# Patient Record
Sex: FEMALE | Race: WHITE | NOT HISPANIC OR LATINO | URBAN - METROPOLITAN AREA
[De-identification: names, ages, dates, MRNs, and addresses within clinical notes are randomized per-mention and may not be internally consistent; named-entity substitution may affect disease eponyms.]

---

## 2019-10-29 ENCOUNTER — OUTPATIENT (OUTPATIENT)
Dept: OUTPATIENT SERVICES | Facility: HOSPITAL | Age: 34
LOS: 1 days | Discharge: HOME | End: 2019-10-29

## 2019-10-30 DIAGNOSIS — R10.9 UNSPECIFIED ABDOMINAL PAIN: ICD-10-CM

## 2020-04-26 ENCOUNTER — MESSAGE (OUTPATIENT)
Age: 35
End: 2020-04-26

## 2021-06-07 ENCOUNTER — EMERGENCY (EMERGENCY)
Facility: HOSPITAL | Age: 36
LOS: 0 days | Discharge: HOME | End: 2021-06-08
Attending: EMERGENCY MEDICINE | Admitting: EMERGENCY MEDICINE
Payer: COMMERCIAL

## 2021-06-07 VITALS
HEIGHT: 65 IN | TEMPERATURE: 98 F | HEART RATE: 86 BPM | SYSTOLIC BLOOD PRESSURE: 119 MMHG | OXYGEN SATURATION: 99 % | DIASTOLIC BLOOD PRESSURE: 58 MMHG | RESPIRATION RATE: 18 BRPM | WEIGHT: 149.91 LBS

## 2021-06-07 DIAGNOSIS — H53.8 OTHER VISUAL DISTURBANCES: ICD-10-CM

## 2021-06-07 DIAGNOSIS — Z98.891 HISTORY OF UTERINE SCAR FROM PREVIOUS SURGERY: Chronic | ICD-10-CM

## 2021-06-07 DIAGNOSIS — H53.131 SUDDEN VISUAL LOSS, RIGHT EYE: ICD-10-CM

## 2021-06-07 LAB
ALBUMIN SERPL ELPH-MCNC: 4.7 G/DL — SIGNIFICANT CHANGE UP (ref 3.5–5.2)
ALP SERPL-CCNC: 72 U/L — SIGNIFICANT CHANGE UP (ref 30–115)
ALT FLD-CCNC: 13 U/L — SIGNIFICANT CHANGE UP (ref 0–41)
ANION GAP SERPL CALC-SCNC: 9 MMOL/L — SIGNIFICANT CHANGE UP (ref 7–14)
APTT BLD: 31.5 SEC — SIGNIFICANT CHANGE UP (ref 27–39.2)
AST SERPL-CCNC: 21 U/L — SIGNIFICANT CHANGE UP (ref 0–41)
BASE EXCESS BLDV CALC-SCNC: 1.1 MMOL/L — SIGNIFICANT CHANGE UP (ref -2–2)
BASOPHILS # BLD AUTO: 0.03 K/UL — SIGNIFICANT CHANGE UP (ref 0–0.2)
BASOPHILS NFR BLD AUTO: 0.4 % — SIGNIFICANT CHANGE UP (ref 0–1)
BILIRUB SERPL-MCNC: 0.4 MG/DL — SIGNIFICANT CHANGE UP (ref 0.2–1.2)
BUN SERPL-MCNC: 17 MG/DL — SIGNIFICANT CHANGE UP (ref 10–20)
CALCIUM SERPL-MCNC: 9.9 MG/DL — SIGNIFICANT CHANGE UP (ref 8.5–10.1)
CHLORIDE SERPL-SCNC: 106 MMOL/L — SIGNIFICANT CHANGE UP (ref 98–110)
CO2 SERPL-SCNC: 26 MMOL/L — SIGNIFICANT CHANGE UP (ref 17–32)
CREAT SERPL-MCNC: 1 MG/DL — SIGNIFICANT CHANGE UP (ref 0.7–1.5)
EOSINOPHIL # BLD AUTO: 0.1 K/UL — SIGNIFICANT CHANGE UP (ref 0–0.7)
EOSINOPHIL NFR BLD AUTO: 1.3 % — SIGNIFICANT CHANGE UP (ref 0–8)
GLUCOSE SERPL-MCNC: 89 MG/DL — SIGNIFICANT CHANGE UP (ref 70–99)
HCG SERPL QL: NEGATIVE — SIGNIFICANT CHANGE UP
HCO3 BLDV-SCNC: 26 MMOL/L — SIGNIFICANT CHANGE UP (ref 22–29)
HCT VFR BLD CALC: 38.4 % — SIGNIFICANT CHANGE UP (ref 37–47)
HGB BLD-MCNC: 12.8 G/DL — SIGNIFICANT CHANGE UP (ref 12–16)
IMM GRANULOCYTES NFR BLD AUTO: 0.4 % — HIGH (ref 0.1–0.3)
INR BLD: 1.09 RATIO — SIGNIFICANT CHANGE UP (ref 0.65–1.3)
LACTATE BLDV-MCNC: 0.6 MMOL/L — SIGNIFICANT CHANGE UP (ref 0.5–1.6)
LYMPHOCYTES # BLD AUTO: 2.26 K/UL — SIGNIFICANT CHANGE UP (ref 1.2–3.4)
LYMPHOCYTES # BLD AUTO: 29 % — SIGNIFICANT CHANGE UP (ref 20.5–51.1)
MCHC RBC-ENTMCNC: 29.5 PG — SIGNIFICANT CHANGE UP (ref 27–31)
MCHC RBC-ENTMCNC: 33.3 G/DL — SIGNIFICANT CHANGE UP (ref 32–37)
MCV RBC AUTO: 88.5 FL — SIGNIFICANT CHANGE UP (ref 81–99)
MONOCYTES # BLD AUTO: 0.67 K/UL — HIGH (ref 0.1–0.6)
MONOCYTES NFR BLD AUTO: 8.6 % — SIGNIFICANT CHANGE UP (ref 1.7–9.3)
NEUTROPHILS # BLD AUTO: 4.7 K/UL — SIGNIFICANT CHANGE UP (ref 1.4–6.5)
NEUTROPHILS NFR BLD AUTO: 60.3 % — SIGNIFICANT CHANGE UP (ref 42.2–75.2)
NRBC # BLD: 0 /100 WBCS — SIGNIFICANT CHANGE UP (ref 0–0)
PCO2 BLDV: 41 MMHG — SIGNIFICANT CHANGE UP (ref 41–51)
PH BLDV: 7.41 — SIGNIFICANT CHANGE UP (ref 7.26–7.43)
PLATELET # BLD AUTO: 213 K/UL — SIGNIFICANT CHANGE UP (ref 130–400)
PO2 BLDV: 46 MMHG — HIGH (ref 20–40)
POTASSIUM SERPL-MCNC: 3.9 MMOL/L — SIGNIFICANT CHANGE UP (ref 3.5–5)
POTASSIUM SERPL-SCNC: 3.9 MMOL/L — SIGNIFICANT CHANGE UP (ref 3.5–5)
PROT SERPL-MCNC: 7.3 G/DL — SIGNIFICANT CHANGE UP (ref 6–8)
PROTHROM AB SERPL-ACNC: 12.5 SEC — SIGNIFICANT CHANGE UP (ref 9.95–12.87)
RBC # BLD: 4.34 M/UL — SIGNIFICANT CHANGE UP (ref 4.2–5.4)
RBC # FLD: 13.2 % — SIGNIFICANT CHANGE UP (ref 11.5–14.5)
SAO2 % BLDV: 81 % — SIGNIFICANT CHANGE UP
SODIUM SERPL-SCNC: 141 MMOL/L — SIGNIFICANT CHANGE UP (ref 135–146)
TROPONIN T SERPL-MCNC: <0.01 NG/ML — SIGNIFICANT CHANGE UP
WBC # BLD: 7.79 K/UL — SIGNIFICANT CHANGE UP (ref 4.8–10.8)
WBC # FLD AUTO: 7.79 K/UL — SIGNIFICANT CHANGE UP (ref 4.8–10.8)

## 2021-06-07 PROCEDURE — 99220: CPT

## 2021-06-07 PROCEDURE — 70498 CT ANGIOGRAPHY NECK: CPT | Mod: 26,MA

## 2021-06-07 PROCEDURE — 70450 CT HEAD/BRAIN W/O DYE: CPT | Mod: 26,59,MA

## 2021-06-07 PROCEDURE — 70496 CT ANGIOGRAPHY HEAD: CPT | Mod: 26,MA

## 2021-06-07 PROCEDURE — 93010 ELECTROCARDIOGRAM REPORT: CPT

## 2021-06-07 PROCEDURE — 99282 EMERGENCY DEPT VISIT SF MDM: CPT

## 2021-06-07 PROCEDURE — 0042T: CPT

## 2021-06-07 RX ORDER — ASPIRIN/CALCIUM CARB/MAGNESIUM 324 MG
325 TABLET ORAL ONCE
Refills: 0 | Status: COMPLETED | OUTPATIENT
Start: 2021-06-07 | End: 2021-06-07

## 2021-06-07 RX ADMIN — Medication 325 MILLIGRAM(S): at 21:21

## 2021-06-07 NOTE — CONSULT NOTE ADULT - SUBJECTIVE AND OBJECTIVE BOX
**STROKE CODE CONSULT NOTE**    Last known well time/Time of onset of symptoms:    HPI: 36 year old female with no PMHx, no FMHx of stroke, coming in with CC of right visual loss. Code stroke upon presentation. Taken to CT scan and further workup.       T(C): 36.5 (06-07-21 @ 20:30), Max: 36.5 (06-07-21 @ 20:15)  HR: 83 (06-07-21 @ 20:30) (83 - 86)  BP: 114/64 (06-07-21 @ 20:30) (114/64 - 119/58)  RR: 20 (06-07-21 @ 20:30) (18 - 20)  SpO2: 99% (06-07-21 @ 20:30) (99% - 99%)    PAST MEDICAL & SURGICAL HISTORY:      FAMILY HISTORY:      SOCIAL HISTORY:    ROS:   Constitutional: No fever, weight loss or fatigue  Eyes: No eye pain, visual disturbances, or discharge  ENMT:  No difficulty hearing, tinnitus, vertigo; No sinus or throat pain  Neck: No pain or stiffness  Respiratory: No cough, wheezing, chills or hemoptysis  Cardiovascular: No chest pain, palpitations, shortness of breath, dizziness or leg swelling  Gastrointestinal: No abdominal pain. No nausea, vomiting or hematemesis; No diarrhea or constipation. Nohematochezia.  Genitourinary: No dysuria, frequency, hematuria or incontinence  Neurological: As per HPI      MEDICATIONS  (STANDING):    MEDICATIONS  (PRN):    Allergies    No Known Allergies    Intolerances      Vital Signs Last 24 Hrs  T(C): 36.5 (07 Jun 2021 20:30), Max: 36.5 (07 Jun 2021 20:15)  T(F): 97.7 (07 Jun 2021 20:30), Max: 97.7 (07 Jun 2021 20:15)  HR: 83 (07 Jun 2021 20:30) (83 - 86)  BP: 114/64 (07 Jun 2021 20:30) (114/64 - 119/58)  BP(mean): --  RR: 20 (07 Jun 2021 20:30) (18 - 20)  SpO2: 99% (07 Jun 2021 20:30) (99% - 99%)    Physical exam:  Constitutional: No acute distress, conversant  Eyes: Anicteric sclerae, moist conjunctivae, see below for CNs  Neck: trachea midline, FROM, supple, no thyromegaly or lymphadenopathy  Cardiovascular: Regular rate and rhythm, no murmurs, rubs, or gallops. No carotid bruits.   Pulmonary: Anterior breath sounds clear bilaterally, no crackles or wheezing. No use of accessory muscles  GI: Abdomen soft, non-distended, non-tender  Extremities: Radial and DP pulses +2, no edema    Neurologic:  -Mental status: Awake, alert, oriented to person, place, and time. Speech is fluent with intact naming, repetition, and comprehension, no dysarthria. Recent and remote memory intact. Follows commands. Attention/concentration intact. Fund of knowledge appropriate.  -Cranial nerves:   II: Visual fields are full to confrontation.  III, IV, VI: Extraocular movements are intact without nystagmus. Pupils equally round and reactive to light  V:  Facial sensation V1-V3 equal and intact   VII: Face is symmetric with normal eye closure and smile  VIII: Hearing is bilaterally intact to finger rub  IX, X: Uvula is midline and soft palate rises symmetrically  XI: Head turning and shoulder shrug are intact.  XII: Tongue protrudes midline  Motor: Normal bulk and tone. No pronator drift. Strength bilateral upper extremity 5/5, bilateral lower extremities 5/5.  Sensation: Intact to light touch bilaterally. No neglect or extinction on double simultaneous testing.  Coordination: No dysmetria on finger-to-nose and heel-to-shin bilaterally  Reflexes: Downgoing toes bilaterally   Gait: Narrow gait and steady    NIHSS:     ·  1a Level of Consciousness	 0   ·  1b Level of Consciousness	 0 	  ·  1c Level of Consciousness	 0 	  ·  2 Best Gaze	 	 0   ·  3 Visual	 	                  0   ·  4 Facial Palsy	 	 0   ·  5a Motor Left Arm	                  0   ·  5b Motor Right Arm	 0   ·  6a Motor Left Leg	                   0   ·  6b Motor Right Leg	 0    ·  7 Limb Ataxia	 	0   ·  8 Sensory	 	0   ·  9 Best language	 	0   ·  10 Dysarthria	 	0   ·  11 Extinction and Inattention 0  	    Total NIHSS Score =0      Fingerstick Blood Glucose: CAPILLARY BLOOD GLUCOSE  85 (07 Jun 2021 20:55)      POCT Blood Glucose.: 85 mg/dL (07 Jun 2021 20:24)    LABS:                        12.8   7.79  )-----------( 213      ( 07 Jun 2021 20:25 )             38.4     RADIOLOGY & ADDITIONAL STUDIES:      < from: CT Brain Stroke Protocol (06.07.21 @ 20:32) >    EXAM:  CT BRAIN STROKE PROTOCOL            PROCEDURE DATE:  06/07/2021      < end of copied text >  < from: CT Brain Stroke Protocol (06.07.21 @ 20:32) >  IMPRESSION:    No acute intracranial pathology.    < end of copied text >    CTA/CTP: Wet read by radiologist indicating no LVO.        -----------------------------------------------------------------------------------------------------------------  IV-tPA (Y/N):    N                           Bolus time:  Reason IV-tPA not given: NIH 0      Assessment and Plan:  36y Female w/ PMH coming in with visual loss. NIH 0 upon examination.  CT head with no acute events. CTA/CTP with no acute findings. No Centralized telestroke attending assessment needed at this time. Patient to be admitted to OBS for workup.       Admit to Obs for further workup:  MRI  TTE  EKG  Labs: A1c, lipid panel, TSH,       Please call with any patient concerns or questions.  Danie Perry NP  #[id number]

## 2021-06-07 NOTE — ED PROVIDER NOTE - PHYSICAL EXAMINATION
CONSTITUTIONAL: Well-developed; well-nourished; in no acute distress.   SKIN: warm, dry  HEAD: Normocephalic; atraumatic.  EYES: PERRL, EOMI, normal sclera and conjunctiva   ENT: No nasal discharge; airway clear.  NECK: Supple; non tender.  CARD:  Regular rate and rhythm.   RESP: NO inc WOB , lungs CTAB   ABD: soft ntnd  EXT: Normal ROM.    LYMPH: No acute cervical adenopathy.  NEURO: AAO x 3, normal speech, no facial asymmetry, negative pronator drift, no ataxia, negative Romberg, no dysdiadokinesia, no nystagmus, peripheral vision intact, sensory equal and intact.  PSYCH: Cooperative, appropriate.

## 2021-06-07 NOTE — ED CDU PROVIDER INITIAL DAY NOTE - PHYSICAL EXAMINATION
PHYSICAL EXAM:    GENERAL: Alert, appears stated age, well appearing, non-toxic  SKIN: Warm, pink and dry. MMM.   HEAD: NC, AT  EYE: Normal lids/conjunctiva, PERRL, EOMI  ENT: Normal hearing, patent oropharynx  NECK: +supple. No meningismus, or JVD  Pulm: Bilateral BS, normal resp effort, no wheezes, stridor, or retractions  CV: RRR, no M/R/G, 2+and = radial pulses  Abd: soft, non-tender, non-distended  Mskel: no erythema, cyanosis, edema. no calf tenderness  Neuro: AAOx3, no sensory/motor deficits, CN 2-12 intact. No speech slurring, pronator drift, facial asymmetry. normal finger-to-nose b/l. 5/5 strength throughout. normal gait. negative romberg.

## 2021-06-07 NOTE — ED PROVIDER NOTE - OBJECTIVE STATEMENT
Pt is a 36y Female w/ PMH coming in with visual loss in her R eye . She reports today she was bathing her daughter when she suddenly lost vision in her R eye.  No numbness or tingling in her extremities. No HA. No n/v . No OCP use, no hx of clotting disorder. Pt reports symptoms have mostly resolved at this time.

## 2021-06-07 NOTE — ED PROVIDER NOTE - CLINICAL SUMMARY MEDICAL DECISION MAKING FREE TEXT BOX
patient had stroke like symptoms, I considered TPA however patients NIH score is 0 at this time. we will admit patient to observation for MRI and further work up.

## 2021-06-07 NOTE — ED CDU PROVIDER INITIAL DAY NOTE - NS ED ROS FT
Review of Systems    Constitutional: (-) fever   Eyes/ENT: (+) vision changes  Cardiovascular: (-) chest pain, (-) syncope (-) palpitations  Respiratory: (-) cough, (-) shortness of breath  Gastrointestinal: (-) vomiting, (-) diarrhea (-) abdominal pain  Genitourinary:  (-) dysuria   Musculoskeletal: (-) neck pain, (-) back pain, (-) leg pain/swelling  Integumentary: (-) rash, (-) edema  Neurological: (-) headache  Hematologic: (-) easy bruising

## 2021-06-07 NOTE — ED CDU PROVIDER INITIAL DAY NOTE - OBJECTIVE STATEMENT
35 y/o F without PMH, was bathing her daughter and suddenly lost vision in R eye @ 7pm, lasting minutes, then resolved. no palliating/provoking factors.   no weakness, paresthesia, n/v/d/ab pain/cp/sob.   in ED NIH 0, neuro recommended obs for tia workup.

## 2021-06-07 NOTE — ED PROVIDER NOTE - ATTENDING CONTRIBUTION TO CARE
patient with new onset vision loss that is central in nature that occurred at 7pm this then resolved over span of minutes patient with new onset vision loss that is central in nature that occurred at 7pm this then resolved over span of minutes. no vision is back to [facility]. she denies any other deficit at the time and currently, exam shows NIH score of 0 with nml vision. plan is to obtain imaging and labs and consult neurology.

## 2021-06-08 ENCOUNTER — TRANSCRIPTION ENCOUNTER (OUTPATIENT)
Age: 36
End: 2021-06-08

## 2021-06-08 VITALS
HEART RATE: 70 BPM | DIASTOLIC BLOOD PRESSURE: 62 MMHG | SYSTOLIC BLOOD PRESSURE: 119 MMHG | RESPIRATION RATE: 18 BRPM | OXYGEN SATURATION: 99 %

## 2021-06-08 LAB
A1C WITH ESTIMATED AVERAGE GLUCOSE RESULT: 4.9 % — SIGNIFICANT CHANGE UP (ref 4–5.6)
CHOLEST SERPL-MCNC: 165 MG/DL — SIGNIFICANT CHANGE UP
ESTIMATED AVERAGE GLUCOSE: 94 MG/DL — SIGNIFICANT CHANGE UP (ref 68–114)
HDLC SERPL-MCNC: 87 MG/DL — SIGNIFICANT CHANGE UP
HIV 1 & 2 AB SERPL IA.RAPID: SIGNIFICANT CHANGE UP
LIPID PNL WITH DIRECT LDL SERPL: 73 MG/DL — SIGNIFICANT CHANGE UP
NON HDL CHOLESTEROL: 78 MG/DL — SIGNIFICANT CHANGE UP
SARS-COV-2 RNA SPEC QL NAA+PROBE: SIGNIFICANT CHANGE UP
TRIGL SERPL-MCNC: 54 MG/DL — SIGNIFICANT CHANGE UP
TSH SERPL-MCNC: 2.44 UIU/ML — SIGNIFICANT CHANGE UP (ref 0.27–4.2)

## 2021-06-08 PROCEDURE — 70553 MRI BRAIN STEM W/O & W/DYE: CPT | Mod: 26,MA

## 2021-06-08 PROCEDURE — 93306 TTE W/DOPPLER COMPLETE: CPT | Mod: 26

## 2021-06-08 PROCEDURE — 70543 MRI ORBT/FAC/NCK W/O &W/DYE: CPT | Mod: 26,MA

## 2021-06-08 PROCEDURE — 99217: CPT

## 2021-06-08 RX ORDER — MUPIROCIN 20 MG/G
1 OINTMENT TOPICAL
Qty: 1 | Refills: 0
Start: 2021-06-08 | End: 2021-06-12

## 2021-06-08 RX ORDER — MUPIROCIN 20 MG/G
1 OINTMENT TOPICAL ONCE
Refills: 0 | Status: DISCONTINUED | OUTPATIENT
Start: 2021-06-08 | End: 2021-06-07

## 2021-06-08 NOTE — ED CDU PROVIDER SUBSEQUENT DAY NOTE - PROGRESS NOTE DETAILS
Patient had MR this evening already. patient doesn't want to wait for MR result. asymptomatic for 24 hours already. prefer to go home.  I had extensive discussion of risks and benefits of pursuing further medical evaluation and/or care with patient and any available family/friends; patient still electing to leave against medical advice. Patient is awake, alert, oriented and demonstrates full capacity and insight into illness. Patient aware and encouraged to return immediately to ED or nearest ED if patient decides to change mind regarding care or if patient experiences any new, worsening, or concerning symptoms.
PT with transient visual disturbance, no complaints at this time. Pending MRI

## 2021-06-08 NOTE — PROGRESS NOTE ADULT - ASSESSMENT
36y Female w no significant past medical history presented with transient visual field deficit in the right eye. Stroke work up was negative initially in the ED.   Currently exam shows no deficit and fundoscopy exam shows normal optic nerves.  Unclear etiology. Recommend to obtain MRI of Brain and orbit w/wo contrast to r/o CNS infarct and demyelinating disease.     - Brain MRI w/wo contrast  - MRI of Orbit w/wo contrast  - Could follow up as an outpatient if the studies were normal

## 2021-06-08 NOTE — ED CDU PROVIDER DISPOSITION NOTE - NSFOLLOWUPINSTRUCTIONS_ED_ALL_ED_FT
Blurred Vision, Adult    Having blurred vision means that you cannot see things clearly. Your vision may seem fuzzy or out of focus. It can involve your vision for objects that are close or far away. It may affect one or both eyes. There are many causes of blurred vision, including cataracts, macular degeneration, eye inflammation (uveitis), and diabetic retinopathy.    ImageIn many cases, blurred vision has to do with the shape of your eye. An abnormal eye shape means you cannot focus well (refractive error). When this happens, it can cause:    Faraway objects to look blurry (nearsightedness).  Close objects to look blurry (farsightedness).  Blurry vision at any distance (astigmatism).    Refractive errors are often corrected with glasses or contacts.    Image ImageBlurred vision can be diagnosed based on your symptoms and a physical exam. Tell your health care provider about any other health problems you have, any recent eye injury, and any prior surgeries. You may need to see a health care provider who specializes in eye problems (ophthalmologist). Your treatment will depend on what is causing your blurred vision.    Follow these instructions at home:  Keep all follow-up visits as told by your health care provider. This is important. These include any visits to your eye specialists.  Do not drive or use heavy machinery if your vision is blurry.  Use eye drops only as told by your health care provider.  If you were prescribed glasses or contact lenses, wear the glasses or contacts as told by your health care provider.  Schedule eye exams regularly.  Pay attention to any changes in your symptoms.  Contact a health care provider if:  Your symptoms do not improve or they get worse.  You have:    New symptoms.  A headache.  Trouble seeing at night.  Trouble noticing the difference between colors.    You notice:    Drooping of your eyelids.  Drainage coming from your eyes.  A rash around your eyes.    Get help right away if:  You have:    Severe eye pain.  A severe headache.  A sudden change in vision.  A sudden loss of vision.  A vision change after an injury.    You notice flashing lights in your field of vision. Your field of vision is the area that you can see without moving your eyes.  Summary  Having blurred vision means that you cannot see things clearly. Your vision may seem fuzzy or out of focus.  There are many causes of blurred vision. In many cases, blurred vision has to do with an abnormal eye shape (refractive error), and it can be corrected with glasses or contact lenses.  Pay attention to any changes in your symptoms. Contact a health care provider if your symptoms do not improve or if you have any new symptoms.  This information is not intended to replace advice given to you by your health care provider. Make sure you discuss any questions you have with your health care provider

## 2021-06-08 NOTE — ED CDU PROVIDER DISPOSITION NOTE - CARE PROVIDER_API CALL
Arsenio Hoffman)  EEGEpilepsy; Neurology  [address] [place] [address], [address]  [place] [address]  Phone: [phone]  Fax: [phone]  Follow Up Time:

## 2021-06-08 NOTE — PROGRESS NOTE ADULT - SUBJECTIVE AND OBJECTIVE BOX
Neurology Follow up note    Name  CARLA FARAH    HPI:  36 year old female with no PMHx, no FMHx of stroke, coming in with CC of right visual loss. She reports sudden onset visual field deficit in the center of the right eye which then started to fade away towards the peripheral vision and resolved spontaneously after few hours. Has mild frontal headache now but denies associated headache. Denies any associated numbness, weakness or speech difficulties.     Interval History -  No overnight issues. CT head, CTA head and neck and CTP were unremarkable       Vital Signs Last 24 Hrs  T(C): 36.4 (08 Jun 2021 07:30), Max: 36.8 (08 Jun 2021 04:38)  T(F): 97.5 (08 Jun 2021 07:30), Max: 98.3 (08 Jun 2021 04:38)  HR: 74 (08 Jun 2021 07:30) (74 - 86)  BP: 118/53 (08 Jun 2021 07:30) (92/51 - 119/58)  BP(mean): --  RR: 16 (08 Jun 2021 07:30) (16 - 20)  SpO2: 99% (08 Jun 2021 07:30) (97% - 99%)  ICU Vital Signs Last 24 Hrs  T(C): 36.4 (08 Jun 2021 07:30), Max: 36.8 (08 Jun 2021 04:38)  T(F): 97.5 (08 Jun 2021 07:30), Max: 98.3 (08 Jun 2021 04:38)  HR: 74 (08 Jun 2021 07:30) (74 - 86)  BP: 118/53 (08 Jun 2021 07:30) (92/51 - 119/58)  BP(mean): --  ABP: --  ABP(mean): --  RR: 16 (08 Jun 2021 07:30) (16 - 20)  SpO2: 99% (08 Jun 2021 07:30) (97% - 99%)      Neurological Exam:   Mental status: Awake, alert and oriented x3.  Recent and remote memory intact.  Naming, repetition and comprehension intact.  Attention/concentration intact.  No dysarthria, no aphasia.  Fund of knowledge appropriate.    Cranial nerves: Fundoscopic exam demonstrated no abnormalities, pupils equally round and reactive to light, visual fields full, no nystagmus, extraocular muscles intact, V1 through V3 intact bilaterally and symmetric, face symmetric, hearing intact to finger rub, palate elevation symmetric, tongue was midline, sternocleidomastoid/shoulder shrug strength bilaterally 5/5.  Fundoscopy exam showed no evidence of disc edema OU.   Motor:  Normal bulk and tone, strength 5/5 in bilateral upper and lower extremities.   strength 5/5.  Rapid alternating movements intact and symmetric.   Sensation: Intact to light touch, proprioception, and pinprick.  No neglect.   Coordination: No dysmetria on finger-to-nose and heel-to-shin.  No clumsiness.  Reflexes: 2+ in upper and lower extremities, downgoing toes bilaterally  Gait: Narrow and steady. No ataxia.  Romberg negative    Medications      Lab                        12.8   7.79  )-----------( 213      ( 07 Jun 2021 20:25 )             38.4     06-07    141  |  106  |  17  ----------------------------<  89  3.9   |  26  |  1.0    Ca    9.9      07 Jun 2021 20:25    TPro  7.3  /  Alb  4.7  /  TBili  0.4  /  DBili  x   /  AST  21  /  ALT  13  /  AlkPhos  72  06-07    CAPILLARY BLOOD GLUCOSE  85 (07 Jun 2021 20:55)      POCT Blood Glucose.: 85 mg/dL (07 Jun 2021 20:24)    LIVER FUNCTIONS - ( 07 Jun 2021 20:25 )  Alb: 4.7 g/dL / Pro: 7.3 g/dL / ALK PHOS: 72 U/L / ALT: 13 U/L / AST: 21 U/L / GGT: x           PT/INR - ( 07 Jun 2021 20:25 )   PT: 12.50 sec;   INR: 1.09 ratio         PTT - ( 07 Jun 2021 20:25 )  PTT:31.5 sec    CTA head and neck:   CTA HEAD:  No evidence of flow-limiting stenosis, occlusion or aneurysm.    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    CTA NECK:  No evidence of carotid artery or vertebral artery stenosis.

## 2021-06-08 NOTE — ED CDU PROVIDER DISPOSITION NOTE - PATIENT PORTAL LINK FT
You can access the FollowMyHealth Patient Portal offered by [facility] by registering at the following website: [email or web address]. By joining [employer]’s FollowMyHealth portal, you will also be able to view your health information using other applications (apps) compatible with our system.

## 2021-06-08 NOTE — ED CDU PROVIDER SUBSEQUENT DAY NOTE - ATTENDING CONTRIBUTION TO CARE
36y Female w/ PMH coming in with visual loss. NIH 0 upon examination.  CT head with no acute events. CTA/CTP with no acute findings. Pt does not want to wait for MRI results. Eager to leave. Asympomatic now w no visual changes. NIH 0. The patient wishes to leave against medical advice.  I have discussed the risks, benefits and alternatives (including the possibility of worsening of disease, pain, permanent disability, and/or death) with the patient and his/her family (if available).  The patient voices understanding of these risks, benefits, and alternatives and still wishes to sign out against medical advice.  The patient is awake, alert, oriented  x 3 and has demonstrated capacity to refuse/direct care.  I have advised the patient that they can and should return immediately should they develop any worse/different/additional symptoms, or if they change their mind and want to continue their care.

## 2021-06-08 NOTE — ED CDU PROVIDER DISPOSITION NOTE - ATTENDING CONTRIBUTION TO CARE
I personally evaluated the patient. I reviewed the Resident’s or Physician Assistant’s note (as assigned above), and agree with the findings and plan except as documented in my note.    36y Female w/ PMH coming in with visual loss. NIH 0 upon examination.  CT head with no acute events. CTA/CTP with no acute findings. Pt does not want to wait for MRI results. Eager to leave. Asympomatic now w no visual changes. NIH 0. The patient wishes to leave against medical advice.  I have discussed the risks, benefits and alternatives (including the possibility of worsening of disease, pain, permanent disability, and/or death) with the patient and his/her family (if available).  The patient voices understanding of these risks, benefits, and alternatives and still wishes to sign out against medical advice.  The patient is awake, alert, oriented  x 3 and has demonstrated capacity to refuse/direct care.  I have advised the patient that they can and should return immediately should they develop any worse/different/additional symptoms, or if they change their mind and want to continue their care.

## 2021-06-09 PROBLEM — Z00.00 ENCOUNTER FOR PREVENTIVE HEALTH EXAMINATION: Status: ACTIVE | Noted: 2021-06-09

## 2021-06-09 NOTE — ED POST DISCHARGE NOTE - RESULT SUMMARY
MR findings disclosed to patient. f/u with neurology as outpatient. patient also reported rash after MR with contrast. recommend benadryl and prednisone sent to patient's pharmacy. encourage return for worsening symptoms.

## 2022-07-21 ENCOUNTER — TRANSCRIPTION ENCOUNTER (OUTPATIENT)
Age: 37
End: 2022-07-21

## 2022-07-21 ENCOUNTER — INPATIENT (INPATIENT)
Facility: HOSPITAL | Age: 37
LOS: 1 days | Discharge: HOME | End: 2022-07-23
Attending: OBSTETRICS & GYNECOLOGY | Admitting: OBSTETRICS & GYNECOLOGY

## 2022-07-21 VITALS — HEART RATE: 80 BPM | DIASTOLIC BLOOD PRESSURE: 57 MMHG | SYSTOLIC BLOOD PRESSURE: 108 MMHG

## 2022-07-21 DIAGNOSIS — Z98.891 HISTORY OF UTERINE SCAR FROM PREVIOUS SURGERY: Chronic | ICD-10-CM

## 2022-07-21 LAB
AMPHET UR-MCNC: NEGATIVE — SIGNIFICANT CHANGE UP
APPEARANCE UR: ABNORMAL
BACTERIA # UR AUTO: NEGATIVE — SIGNIFICANT CHANGE UP
BARBITURATES UR SCN-MCNC: NEGATIVE — SIGNIFICANT CHANGE UP
BASOPHILS # BLD AUTO: 0.05 K/UL — SIGNIFICANT CHANGE UP (ref 0–0.2)
BASOPHILS NFR BLD AUTO: 0.4 % — SIGNIFICANT CHANGE UP (ref 0–1)
BENZODIAZ UR-MCNC: NEGATIVE — SIGNIFICANT CHANGE UP
BILIRUB UR-MCNC: NEGATIVE — SIGNIFICANT CHANGE UP
BLD GP AB SCN SERPL QL: SIGNIFICANT CHANGE UP
BUPRENORPHINE SCREEN, URINE RESULT: NEGATIVE — SIGNIFICANT CHANGE UP
COCAINE METAB.OTHER UR-MCNC: NEGATIVE — SIGNIFICANT CHANGE UP
COLOR SPEC: YELLOW — SIGNIFICANT CHANGE UP
DIFF PNL FLD: NEGATIVE — SIGNIFICANT CHANGE UP
EOSINOPHIL # BLD AUTO: 0.03 K/UL — SIGNIFICANT CHANGE UP (ref 0–0.7)
EOSINOPHIL NFR BLD AUTO: 0.2 % — SIGNIFICANT CHANGE UP (ref 0–8)
EPI CELLS # UR: 3 /HPF — SIGNIFICANT CHANGE UP (ref 0–5)
FENTANYL UR QL: NEGATIVE — SIGNIFICANT CHANGE UP
GLUCOSE UR QL: NEGATIVE — SIGNIFICANT CHANGE UP
HCT VFR BLD CALC: 34.1 % — LOW (ref 37–47)
HGB BLD-MCNC: 11.9 G/DL — LOW (ref 12–16)
HIV 1 & 2 AB SERPL IA.RAPID: SIGNIFICANT CHANGE UP
HYALINE CASTS # UR AUTO: 6 /LPF — SIGNIFICANT CHANGE UP (ref 0–7)
IMM GRANULOCYTES NFR BLD AUTO: 1 % — HIGH (ref 0.1–0.3)
KETONES UR-MCNC: ABNORMAL
L&D DRUG SCREEN, URINE: SIGNIFICANT CHANGE UP
LEUKOCYTE ESTERASE UR-ACNC: NEGATIVE — SIGNIFICANT CHANGE UP
LYMPHOCYTES # BLD AUTO: 1.74 K/UL — SIGNIFICANT CHANGE UP (ref 1.2–3.4)
LYMPHOCYTES # BLD AUTO: 13.9 % — LOW (ref 20.5–51.1)
MCHC RBC-ENTMCNC: 31.1 PG — HIGH (ref 27–31)
MCHC RBC-ENTMCNC: 34.9 G/DL — SIGNIFICANT CHANGE UP (ref 32–37)
MCV RBC AUTO: 89 FL — SIGNIFICANT CHANGE UP (ref 81–99)
METHADONE UR-MCNC: NEGATIVE — SIGNIFICANT CHANGE UP
MONOCYTES # BLD AUTO: 0.99 K/UL — HIGH (ref 0.1–0.6)
MONOCYTES NFR BLD AUTO: 7.9 % — SIGNIFICANT CHANGE UP (ref 1.7–9.3)
NEUTROPHILS # BLD AUTO: 9.61 K/UL — HIGH (ref 1.4–6.5)
NEUTROPHILS NFR BLD AUTO: 76.6 % — HIGH (ref 42.2–75.2)
NITRITE UR-MCNC: NEGATIVE — SIGNIFICANT CHANGE UP
NRBC # BLD: 0 /100 WBCS — SIGNIFICANT CHANGE UP (ref 0–0)
OPIATES UR-MCNC: NEGATIVE — SIGNIFICANT CHANGE UP
OXYCODONE UR-MCNC: NEGATIVE — SIGNIFICANT CHANGE UP
PCP UR-MCNC: NEGATIVE — SIGNIFICANT CHANGE UP
PH UR: 7 — SIGNIFICANT CHANGE UP (ref 5–8)
PLATELET # BLD AUTO: 147 K/UL — SIGNIFICANT CHANGE UP (ref 130–400)
PRENATAL SYPHILIS TEST: SIGNIFICANT CHANGE UP
PROPOXYPHENE QUALITATIVE URINE RESULT: NEGATIVE — SIGNIFICANT CHANGE UP
PROT UR-MCNC: SIGNIFICANT CHANGE UP
RBC # BLD: 3.83 M/UL — LOW (ref 4.2–5.4)
RBC # FLD: 13.2 % — SIGNIFICANT CHANGE UP (ref 11.5–14.5)
RBC CASTS # UR COMP ASSIST: 3 /HPF — SIGNIFICANT CHANGE UP (ref 0–4)
SARS-COV-2 RNA SPEC QL NAA+PROBE: SIGNIFICANT CHANGE UP
SP GR SPEC: 1.02 — SIGNIFICANT CHANGE UP (ref 1.01–1.03)
UROBILINOGEN FLD QL: SIGNIFICANT CHANGE UP
WBC # BLD: 12.54 K/UL — HIGH (ref 4.8–10.8)
WBC # FLD AUTO: 12.54 K/UL — HIGH (ref 4.8–10.8)
WBC UR QL: 3 /HPF — SIGNIFICANT CHANGE UP (ref 0–5)

## 2022-07-21 RX ORDER — AZITHROMYCIN 500 MG/1
TABLET, FILM COATED ORAL
Refills: 0 | Status: DISCONTINUED | OUTPATIENT
Start: 2022-07-21 | End: 2022-07-22

## 2022-07-21 RX ORDER — SODIUM CHLORIDE 9 MG/ML
1000 INJECTION, SOLUTION INTRAVENOUS
Refills: 0 | Status: DISCONTINUED | OUTPATIENT
Start: 2022-07-21 | End: 2022-07-22

## 2022-07-21 RX ORDER — AZITHROMYCIN 500 MG/1
500 TABLET, FILM COATED ORAL EVERY 24 HOURS
Refills: 0 | Status: DISCONTINUED | OUTPATIENT
Start: 2022-07-22 | End: 2022-07-22

## 2022-07-21 RX ORDER — OXYTOCIN 10 UNIT/ML
333.33 VIAL (ML) INJECTION
Qty: 20 | Refills: 0 | Status: DISCONTINUED | OUTPATIENT
Start: 2022-07-21 | End: 2022-07-22

## 2022-07-21 RX ORDER — FAMOTIDINE 10 MG/ML
20 INJECTION INTRAVENOUS ONCE
Refills: 0 | Status: DISCONTINUED | OUTPATIENT
Start: 2022-07-21 | End: 2022-07-22

## 2022-07-21 RX ORDER — SODIUM CHLORIDE 9 MG/ML
1000 INJECTION, SOLUTION INTRAVENOUS ONCE
Refills: 0 | Status: DISCONTINUED | OUTPATIENT
Start: 2022-07-21 | End: 2022-07-22

## 2022-07-21 RX ORDER — CITRIC ACID/SODIUM CITRATE 300-500 MG
30 SOLUTION, ORAL ORAL ONCE
Refills: 0 | Status: DISCONTINUED | OUTPATIENT
Start: 2022-07-21 | End: 2022-07-22

## 2022-07-21 RX ORDER — TRIAMCINOLONE 4 MG
10 TABLET ORAL ONCE
Refills: 0 | Status: COMPLETED | OUTPATIENT
Start: 2022-07-21 | End: 2022-07-21

## 2022-07-21 RX ORDER — CEFAZOLIN SODIUM 1 G
2000 VIAL (EA) INJECTION ONCE
Refills: 0 | Status: COMPLETED | OUTPATIENT
Start: 2022-07-21 | End: 2022-07-21

## 2022-07-21 RX ORDER — AZITHROMYCIN 500 MG/1
500 TABLET, FILM COATED ORAL ONCE
Refills: 0 | Status: COMPLETED | OUTPATIENT
Start: 2022-07-21 | End: 2022-07-21

## 2022-07-21 RX ADMIN — AZITHROMYCIN 255 MILLIGRAM(S): 500 TABLET, FILM COATED ORAL at 23:22

## 2022-07-21 RX ADMIN — SODIUM CHLORIDE 125 MILLILITER(S): 9 INJECTION, SOLUTION INTRAVENOUS at 17:38

## 2022-07-21 RX ADMIN — Medication 100 MILLIGRAM(S): at 22:45

## 2022-07-21 NOTE — PROCEDURE NOTE - NSANESPROCED_OBGYN_ALL_OB
Epidural Catheter Placement/Spinal Anesthetic
Epidural Catheter Placement/Continuous Spinal Epidural/Spinal Anesthetic

## 2022-07-21 NOTE — OB PROVIDER H&P - HISTORY OF PRESENT ILLNESS
37y  at 38w3d dated by LMP c/w sono presenting with CTX. Since this AM, now q2-3min, rated 7/10. Denies LOF, VB. Good FM. GBS neg. Previous LTCS for arrest of descent at full dilation, desiring TOLAC.

## 2022-07-21 NOTE — OB RN DELIVERY SUMMARY - NSSELHIDDEN_OBGYN_ALL_OB_FT
[[email or web address]_ALL_OB_FT:[id number]],[NS_DeliveryRN_OBGYN_ALL_OB_FT:[id number]],[NS_CirculateRN2_OBGYN_ALL_OB_FT:[id number]]

## 2022-07-21 NOTE — OB PROVIDER H&P - NSHPPHYSICALEXAM_GEN_ALL_CORE
HR: 80 (07-21-22 @ 17:25) (80 - 80)  BP: 108/57 (07-21-22 @ 17:25) (108/57 - 108/57)      Gen: A+OX3. NAD  Abd: Soft, Nontender. Gravid. No incisional tenderness  SVE: 3-4cm per Dr. Villanueva    FHR: 120BPM/mod mahin/accels pos  TOCO: q2-4min      EFW by Leopolds: [id number]

## 2022-07-21 NOTE — OB PROVIDER H&P - NSHPREVIEWOFSYSTEMS_GEN_ALL_CORE
Received call from Marisela Cranker with [address]; states that patient was seen today for a weight check and bili check. Julissa reports that patient's weight is up 2oz from two days ago and she currently weighs 5lb9oz. Julissa states that bili was drawn today and sent in to lab, but due to delay the results are unknown at this current time; she hopes to have these results prior to close of business today and if not will call first thing in the morning when our office opens to report these results as patient has an appointment tomorrow. Informed that information will be passed to Dr. Rob Singh and provider who will be seeing the patient tomorrow for review.     Swathi Rodriguez LPN Denies the following: constitutional symptoms, visual symptoms, cardiovascular symptoms, respiratory symptoms, GI symptoms, musculoskeletal symptoms, skin symptoms, neurologic symptoms, hematologic symptoms, allergic symptoms, psychiatric symptoms  Except any pertinent positives listed.

## 2022-07-21 NOTE — PRE-ANESTHESIA EVALUATION ADULT - WEIGHT IN KG
D/w pt  Recommended f/u L br u/s in 6 months, but pt asking if it should be done earlier since she has intermittent \"sensations\" in the L breast.  She had no symptoms when I saw her 4 months ago  I am recommending she return for a breast exam now.  May need more than a f/u breast u/s.  Pt is declining to make an appt at this time.  She prefers to f/u w/ her gyne.  I reinforced the need to get a clinical breast exam to determine if further f/u other than the 6 month u/s would be needed.  Pt expressed understanding.
LM for pt to call back  L breast has some clustered cysts on screening u/s  Recommend L br u/s in 6 months
Mammogram is ok, but breasts are dense...screening ultrasound is available to complete breast cancer screening.  You may want to check with insurer to make sure this test is a covered benefit. The u/s is already on order for you.
Patient is informed of stephany results and will go forward with US.
84.4

## 2022-07-21 NOTE — OB PROVIDER H&P - NS_OBGYNHISTORY_OBGYN_ALL_OB_FT
OB: FT [id number]    GYN: denies history of fibroids, ovarian cysts, abnormal papsmears, and STIs

## 2022-07-21 NOTE — OB RN DELIVERY SUMMARY - NSDELIVERYTYPEA_OBGYN_ALL_OB
Initial Anesthesia Post-op Note    Patient: Jesus Dia  Procedure(s) Performed: NECK DISSECTION MODIFIED RADICAL  RIGHT - RIGHTTONGUE LESION EXCISION  ANT 2/3  Anesthesia type: General    Vitals Value Taken Time   Temp 98.8 5/1/2019 12:10 PM   Pulse 95 5/1/2019 12:08 PM   Resp 13 5/1/2019 12:08 PM   /93 5/1/2019 12:06 PM   SpO2 93 % 5/1/2019 12:08 PM   Vitals shown include unvalidated device data.    Last 24 I/O:     Intake/Output Summary (Last 24 hours) at 5/1/2019 1210  Last data filed at 5/1/2019 1130  Gross per 24 hour   Intake --   Output 350 ml   Net -350 ml       PATIENT LOCATION: PACU Phase 1  POST-OP VITAL SIGNS: stable  LEVEL OF CONSCIOUSNESS: participates in exam, awake, oriented, answers questions appropriately and alert  RESPIRATORY STATUS: spontaneous ventilation  CARDIOVASCULAR: blood pressure returned to baseline  HYDRATION: euvolemic    PAIN MANAGEMENT: well controlled  NAUSEA: None  AIRWAY PATENCY: patent  POST-OP ASSESSMENT: no complications, patient tolerated procedure well with no complications and sufficiently recovered from acute administration of anesthesia effects and able to participate in evaluation  COMPLICATIONS: none  HANDOFF:  Handoff to receiving nurse was performed and questions were answered (Tracy SIFUENTES)      
 Delivery

## 2022-07-21 NOTE — OB RN PATIENT PROFILE - PAIN RATING/NUMBER SCALE (0-10): REST
Juan Francisco Walls Patient Age: 54 year old  MESSAGE:   Patient's wife is calling and would like to know if the patient should come in on Monday to follow up from hospital? And if so can the patient be worked in at the same time as her appt? Please advise.     Next and Last Visit with Provider and Department  Last visit with this provider: 4/4/2019  Last visit with this department: 4/4/2019    Next visit with this provider: Visit date not found  Next visit with this department: Visit date not found    WEIGHT AND HEIGHT:   Wt Readings from Last 1 Encounters:   04/02/19 98.4 kg (217 lb)     Ht Readings from Last 1 Encounters:   04/02/19 5' 11\" (1.803 m)     BMI Readings from Last 1 Encounters:   04/02/19 30.27 kg/m²       ALLERGIES:  Iodine   (environmental or med) and Levofloxacin  Current Outpatient Medications   Medication   • HYDROcodone-acetaminophen (NORCO) 5-325 MG per tablet   • atorvastatin (LIPITOR) 20 MG tablet   • fluticasone (FLOVENT HFA) 110 MCG/ACT inhaler   • PYRIDOXINE HCL PO   • aspirin 81 MG tablet   • Cetirizine HCl 10 MG Cap   • potassium citrate 15 MEQ (1620 MG) extended-release tablet     No current facility-administered medications for this visit.      PHARMACY to use:           Pharmacy preference(s) on file:   [facility] [id number] [place] [address] - [facility] [address] AT [facility] [address]  [address]  [facility] [address]  Phone: [phone] Fax: [phone]      CALL BACK INFO: Ok to leave response (including medical information) with family member or on answering machine  ROUTING: Patient's physician/staff        PCP: Nikos Becerra MD         INS: Payor: BLUE CROSS [place]ELD [place] / Plan: BLUE CHOICE PREFERRED / Product Type: PPO MISC   PATIENT ADDRESS:  [address] [facility] [address]  
Spoke with wife     Scheduled for next Thursday for ER follow up  
5

## 2022-07-21 NOTE — OB PROVIDER H&P - ASSESSMENT
37y  at 38w3d, GBS neg, desiring TOLAC, labor at term  -Admit to L+D  -Monitor EFM and TOCO   -IVF and labs  -Pain control PRN  -Clear liquid diet as tolerated  -discussed risks of uterine rupture including: maternal hemorrhage, fetal hemorrhage, maternal death, and fetal death associated with TOLAC versus repeat   -pt demonstrated understanding risks/alternatives/benefits and prefers TOLAC for  but is amendable to repeat  if fetal or maternal indications are warranted     Dr. Villanueva and Dr. Shukla to be aware

## 2022-07-21 NOTE — PROGRESS NOTE ADULT - ASSESSMENT
A/P: 37y  at 38w3d, GBS neg, s/p epidural, s/p AROM, with unengaged vertex with arrest of dilation for repeat C/S  -discussed risks, benefits, and alternatives of    -dicussed the risks of bleeding, infection, injury to surrounding organs and structures, and potential need for a blood transfusion   -admit to L&D  -NPO   -IVF hydration   -ancef   -prather   -skin prep   -pepcid, bicitra, reglan per routine  -peds notified of admission  -on call to OR  -anesthesia notified of admission    Dr. Villanueva at bedside

## 2022-07-21 NOTE — PROCEDURE NOTE - ADDITIONAL PROCEDURE DETAILS
CSE block Sensorcaine 0.25% 1ml intrathecally. Sensorcaine 0.0625% Fentanyl 500 mcg 15 ml/h
Sensorcaine 0.25% 1 ml Sensorcaine 0.0625% Fentanyl 500 mcg @ 15 ml/h

## 2022-07-21 NOTE — OB RN INTRAOPERATIVE NOTE - NSSELHIDDEN_OBGYN_ALL_OB_FT
[NS_DeliveryRN_OBGYN_ALL_OB_FT:[id number]=],[NS_CirculateRN2_OBGYN_ALL_OB_FT:[id number]=],[NS_DeliveryAttending1_OBGYN_ALL_OB_FT:[id number]=]

## 2022-07-21 NOTE — OB RN PATIENT PROFILE - FALL HARM RISK - UNIVERSAL INTERVENTIONS
Bed in lowest position, wheels locked, appropriate side rails in place/Call bell, personal items and telephone in reach/Instruct patient to call for assistance before getting out of bed or chair/Non-slip footwear when patient is out of bed/[place] to call system/Physically safe environment - no spills, clutter or unnecessary equipment/Purposeful Proactive Rounding/Room/bathroom lighting operational, light cord in reach

## 2022-07-21 NOTE — PROGRESS NOTE ADULT - SUBJECTIVE AND OBJECTIVE BOX
PGY4 Note    Patient seen at bedside for evaluation of labor progression. No complaints at the moment. Reports intermittent pressure with contractions.     T(F): 99.32 ( @ 22:05), Max: 99.32 ( @ 22:05)  HR: 96 ( @ 22:42)  BP: 110/47 ( @ 22:42) (84/49 - 118/57)  RR: 16 ( @ 17:39)    EFM: 130bpm/moderate variability/+accels   TOCO: q2mins   SVE: 4/80/-2 per Dr. Hood    Medications:  ceFAZolin   IVPB: 100 ( @ 22:45)  lactated ringers.: 125 ( @ 17:21)      Labs:                        11.9   12.54 )-----------( 147      ( 2022 17:53 )             34.1           Prenatal Syphilis Test: Nonreact ( @ 17:53)  Rapid HIV-1/2 Antibody: Nonreact ( @ 17:53)  Antibody Screen: NEG (22 @ 17:53)    Urinalysis Basic - ( 2022 17:53 )    Color: Yellow / Appearance: Turbid / S.019 / pH: x  Gluc: x / Ketone: Large  / Bili: Negative / Urobili: <2 mg/dL   Blood: x / Protein: Trace / Nitrite: Negative   Leuk Esterase: Negative / RBC: 3 /HPF / WBC 3 /HPF   Sq Epi: x / Non Sq Epi: 3 /HPF / Bacteria: Negative

## 2022-07-22 ENCOUNTER — TRANSCRIPTION ENCOUNTER (OUTPATIENT)
Age: 37
End: 2022-07-22

## 2022-07-22 PROBLEM — Z78.9 OTHER SPECIFIED HEALTH STATUS: Chronic | Status: ACTIVE | Noted: 2021-06-07

## 2022-07-22 LAB
BASOPHILS # BLD AUTO: 0.02 K/UL — SIGNIFICANT CHANGE UP (ref 0–0.2)
BASOPHILS NFR BLD AUTO: 0.1 % — SIGNIFICANT CHANGE UP (ref 0–1)
EOSINOPHIL # BLD AUTO: 0.02 K/UL — SIGNIFICANT CHANGE UP (ref 0–0.7)
EOSINOPHIL NFR BLD AUTO: 0.1 % — SIGNIFICANT CHANGE UP (ref 0–8)
HCT VFR BLD CALC: 29.5 % — LOW (ref 37–47)
HGB BLD-MCNC: 10.2 G/DL — LOW (ref 12–16)
IMM GRANULOCYTES NFR BLD AUTO: 0.6 % — HIGH (ref 0.1–0.3)
LYMPHOCYTES # BLD AUTO: 1.13 K/UL — LOW (ref 1.2–3.4)
LYMPHOCYTES # BLD AUTO: 7.3 % — LOW (ref 20.5–51.1)
MCHC RBC-ENTMCNC: 30.9 PG — SIGNIFICANT CHANGE UP (ref 27–31)
MCHC RBC-ENTMCNC: 34.6 G/DL — SIGNIFICANT CHANGE UP (ref 32–37)
MCV RBC AUTO: 89.4 FL — SIGNIFICANT CHANGE UP (ref 81–99)
MONOCYTES # BLD AUTO: 1.07 K/UL — HIGH (ref 0.1–0.6)
MONOCYTES NFR BLD AUTO: 6.9 % — SIGNIFICANT CHANGE UP (ref 1.7–9.3)
NEUTROPHILS # BLD AUTO: 13.17 K/UL — HIGH (ref 1.4–6.5)
NEUTROPHILS NFR BLD AUTO: 85 % — HIGH (ref 42.2–75.2)
NRBC # BLD: 0 /100 WBCS — SIGNIFICANT CHANGE UP (ref 0–0)
PLATELET # BLD AUTO: 137 K/UL — SIGNIFICANT CHANGE UP (ref 130–400)
RBC # BLD: 3.3 M/UL — LOW (ref 4.2–5.4)
RBC # FLD: 13.2 % — SIGNIFICANT CHANGE UP (ref 11.5–14.5)
WBC # BLD: 15.5 K/UL — HIGH (ref 4.8–10.8)
WBC # FLD AUTO: 15.5 K/UL — HIGH (ref 4.8–10.8)

## 2022-07-22 RX ORDER — DIPHENHYDRAMINE HCL 50 MG
25 CAPSULE ORAL EVERY 6 HOURS
Refills: 0 | Status: DISCONTINUED | OUTPATIENT
Start: 2022-07-22 | End: 2022-07-23

## 2022-07-22 RX ORDER — OXYCODONE HYDROCHLORIDE 5 MG/1
5 TABLET ORAL
Refills: 0 | Status: DISCONTINUED | OUTPATIENT
Start: 2022-07-22 | End: 2022-07-22

## 2022-07-22 RX ORDER — IBUPROFEN 200 MG
600 TABLET ORAL EVERY 6 HOURS
Refills: 0 | Status: COMPLETED | OUTPATIENT
Start: 2022-07-22 | End: 2023-06-20

## 2022-07-22 RX ORDER — OXYCODONE HYDROCHLORIDE 5 MG/1
5 TABLET ORAL ONCE
Refills: 0 | Status: DISCONTINUED | OUTPATIENT
Start: 2022-07-22 | End: 2022-07-22

## 2022-07-22 RX ORDER — LANOLIN
1 OINTMENT (GRAM) TOPICAL EVERY 6 HOURS
Refills: 0 | Status: DISCONTINUED | OUTPATIENT
Start: 2022-07-22 | End: 2022-07-23

## 2022-07-22 RX ORDER — SIMETHICONE 80 MG/1
80 TABLET, CHEWABLE ORAL EVERY 4 HOURS
Refills: 0 | Status: DISCONTINUED | OUTPATIENT
Start: 2022-07-22 | End: 2022-07-23

## 2022-07-22 RX ORDER — OXYCODONE AND ACETAMINOPHEN 5; 325 MG/1; MG/1
2 TABLET ORAL EVERY 6 HOURS
Refills: 0 | Status: DISCONTINUED | OUTPATIENT
Start: 2022-07-22 | End: 2022-07-23

## 2022-07-22 RX ORDER — OXYCODONE HYDROCHLORIDE 5 MG/1
5 TABLET ORAL
Refills: 0 | Status: COMPLETED | OUTPATIENT
Start: 2022-07-22 | End: 2022-07-29

## 2022-07-22 RX ORDER — OXYCODONE AND ACETAMINOPHEN 5; 325 MG/1; MG/1
1 TABLET ORAL EVERY 6 HOURS
Refills: 0 | Status: DISCONTINUED | OUTPATIENT
Start: 2022-07-22 | End: 2022-07-22

## 2022-07-22 RX ORDER — SODIUM CHLORIDE 9 MG/ML
1000 INJECTION, SOLUTION INTRAVENOUS
Refills: 0 | Status: DISCONTINUED | OUTPATIENT
Start: 2022-07-22 | End: 2022-07-23

## 2022-07-22 RX ORDER — OXYTOCIN 10 UNIT/ML
333.33 VIAL (ML) INJECTION
Qty: 20 | Refills: 0 | Status: DISCONTINUED | OUTPATIENT
Start: 2022-07-22 | End: 2022-07-23

## 2022-07-22 RX ORDER — MAGNESIUM HYDROXIDE 400 MG/1
30 TABLET, CHEWABLE ORAL
Refills: 0 | Status: DISCONTINUED | OUTPATIENT
Start: 2022-07-22 | End: 2022-07-23

## 2022-07-22 RX ORDER — TETANUS TOXOID, REDUCED DIPHTHERIA TOXOID AND ACELLULAR PERTUSSIS VACCINE, ADSORBED 5; 2.5; 8; 8; 2.5 [IU]/.5ML; [IU]/.5ML; UG/.5ML; UG/.5ML; UG/.5ML
0.5 SUSPENSION INTRAMUSCULAR ONCE
Refills: 0 | Status: DISCONTINUED | OUTPATIENT
Start: 2022-07-22 | End: 2022-07-23

## 2022-07-22 RX ORDER — ENOXAPARIN SODIUM 100 MG/ML
40 INJECTION SUBCUTANEOUS EVERY 24 HOURS
Refills: 0 | Status: DISCONTINUED | OUTPATIENT
Start: 2022-07-22 | End: 2022-07-23

## 2022-07-22 RX ORDER — KETOROLAC TROMETHAMINE 30 MG/ML
30 SYRINGE (ML) INJECTION EVERY 6 HOURS
Refills: 0 | Status: DISCONTINUED | OUTPATIENT
Start: 2022-07-22 | End: 2022-07-23

## 2022-07-22 RX ORDER — ACETAMINOPHEN 500 MG
975 TABLET ORAL
Refills: 0 | Status: DISCONTINUED | OUTPATIENT
Start: 2022-07-22 | End: 2022-07-22

## 2022-07-22 RX ORDER — HYDROMORPHONE HYDROCHLORIDE 2 MG/ML
1 INJECTION INTRAMUSCULAR; INTRAVENOUS; SUBCUTANEOUS EVERY 4 HOURS
Refills: 0 | Status: DISCONTINUED | OUTPATIENT
Start: 2022-07-22 | End: 2022-07-23

## 2022-07-22 RX ORDER — ACETAMINOPHEN 500 MG
1000 TABLET ORAL ONCE
Refills: 0 | Status: COMPLETED | OUTPATIENT
Start: 2022-07-22 | End: 2022-07-22

## 2022-07-22 RX ADMIN — OXYCODONE AND ACETAMINOPHEN 2 TABLET(S): 5; 325 TABLET ORAL at 22:56

## 2022-07-22 RX ADMIN — OXYCODONE AND ACETAMINOPHEN 2 TABLET(S): 5; 325 TABLET ORAL at 23:36

## 2022-07-22 RX ADMIN — Medication 30 MILLIGRAM(S): at 05:10

## 2022-07-22 RX ADMIN — SIMETHICONE 80 MILLIGRAM(S): 80 TABLET, CHEWABLE ORAL at 16:41

## 2022-07-22 RX ADMIN — Medication 400 MILLIGRAM(S): at 16:35

## 2022-07-22 RX ADMIN — Medication 30 MILLIGRAM(S): at 20:18

## 2022-07-22 RX ADMIN — OXYCODONE HYDROCHLORIDE 5 MILLIGRAM(S): 5 TABLET ORAL at 07:45

## 2022-07-22 RX ADMIN — Medication 975 MILLIGRAM(S): at 05:49

## 2022-07-22 RX ADMIN — Medication 30 MILLIGRAM(S): at 18:28

## 2022-07-22 RX ADMIN — Medication 975 MILLIGRAM(S): at 12:00

## 2022-07-22 RX ADMIN — HYDROMORPHONE HYDROCHLORIDE 1 MILLIGRAM(S): 2 INJECTION INTRAMUSCULAR; INTRAVENOUS; SUBCUTANEOUS at 09:07

## 2022-07-22 RX ADMIN — Medication 975 MILLIGRAM(S): at 04:32

## 2022-07-22 RX ADMIN — ENOXAPARIN SODIUM 40 MILLIGRAM(S): 100 INJECTION SUBCUTANEOUS at 15:08

## 2022-07-22 RX ADMIN — Medication 30 MILLIGRAM(S): at 12:09

## 2022-07-22 RX ADMIN — Medication 30 MILLIGRAM(S): at 05:50

## 2022-07-22 RX ADMIN — Medication 1000 MILLIGRAM(S): at 17:30

## 2022-07-22 RX ADMIN — Medication 975 MILLIGRAM(S): at 11:15

## 2022-07-22 RX ADMIN — Medication 1 TABLET(S): at 12:08

## 2022-07-22 RX ADMIN — SIMETHICONE 80 MILLIGRAM(S): 80 TABLET, CHEWABLE ORAL at 12:10

## 2022-07-22 RX ADMIN — SIMETHICONE 80 MILLIGRAM(S): 80 TABLET, CHEWABLE ORAL at 20:51

## 2022-07-22 RX ADMIN — Medication 30 MILLIGRAM(S): at 12:43

## 2022-07-22 RX ADMIN — MAGNESIUM HYDROXIDE 30 MILLILITER(S): 400 TABLET, CHEWABLE ORAL at 20:51

## 2022-07-22 RX ADMIN — HYDROMORPHONE HYDROCHLORIDE 1 MILLIGRAM(S): 2 INJECTION INTRAMUSCULAR; INTRAVENOUS; SUBCUTANEOUS at 10:06

## 2022-07-22 RX ADMIN — Medication 10 MILLIGRAM(S): at 00:37

## 2022-07-22 NOTE — BRIEF OPERATIVE NOTE - NSICDXBRIEFPREOP_GEN_ALL_CORE_FT
PRE-OP DIAGNOSIS:  38 weeks gestation of pregnancy 22-Jul-2022 01:35:48  Blanca Verdugo  Complication of first stage of labor 22-Jul-2022 01:37:30 arrest of dilation Blanca Verdugo

## 2022-07-22 NOTE — BRIEF OPERATIVE NOTE - OPERATION/FINDINGS
delivery of viable male infant in the cephalic position with APGARs 9/9. Minimal adhesions noted between uterus and bladder. Normal appearing bilateral fallopian tubes and ovaries

## 2022-07-22 NOTE — PROGRESS NOTE ADULT - SUBJECTIVE AND OBJECTIVE BOX
CARLA FARAH  37y  Female    PGY4 Note:    POD#1    Pt is recovering well, no acute complaints. Pt denies headache, chest pain, SOB, fever, chills, nausea, vomiting, extremity pain or swelling. Pt denies palpitations, shortness of breath, dizziness, or syncope.     Ambulating: No, SCDs on   Voiding: No, indwelling urinary catheter in place  Flatus: No   Bowel movements: No   Breast or bottle feeding: Breastfeeding   Diet: Regular    MEDICATIONS  (STANDING):  acetaminophen     Tablet .. 975 milliGRAM(s) Oral <User Schedule>  diphtheria/tetanus/pertussis (acellular) Vaccine (ADAcel) 0.5 milliLiter(s) IntraMuscular once  enoxaparin Injectable 40 milliGRAM(s) SubCutaneous every 24 hours  ibuprofen  Tablet. 600 milliGRAM(s) Oral every 6 hours  ketorolac   Injectable 30 milliGRAM(s) IV Push every 6 hours  lactated ringers. 1000 milliLiter(s) (125 mL/Hr) IV Continuous <Continuous>  oxytocin Infusion 333.333 milliUNIT(s)/Min (1000 mL/Hr) IV Continuous <Continuous>  prenatal multivitamin 1 Tablet(s) Oral daily    MEDICATIONS  (PRN):  diphenhydrAMINE 25 milliGRAM(s) Oral every 6 hours PRN Pruritus  lanolin Ointment 1 Application(s) Topical every 6 hours PRN Sore Nipples  magnesium hydroxide Suspension 30 milliLiter(s) Oral two times a day PRN Constipation  oxyCODONE    IR 5 milliGRAM(s) Oral every 3 hours PRN Moderate to Severe Pain (4-10)  oxyCODONE    IR 5 milliGRAM(s) Oral once PRN Moderate to Severe Pain (4-10)  simethicone 80 milliGRAM(s) Chew every 4 hours PRN Gas      PAST MEDICAL & SURGICAL HISTORY:  No pertinent past medical history  H/O:       Physical Exam  Vital Signs Last 24 Hrs  T(C): 36.2 (2022 04:27), Max: 37.4 (2022 22:05)  T(F): 97.1 (2022 04:27), Max: 99.32 (2022 22:05)  HR: 68 (2022 04:27) (59 - 108)  BP: 97/52 (2022 04:27) (84/49 - 118/57)  RR: 18 (2022 04:27) (16 - 18)  SpO2: 96% (2022 03:35) (96% - 100%)          UO:     Gen: AAOx3, NAD  Heart:   Lungs:   Fundus: firm, below umbilicus   Wound: steris in place c/d/i   Abd: Soft, appropriately tender near incision site, mildly distended, BS+  Lochia:   Ext: No calf tenderness, no swelling    Labs:                        11.9   12.54 )-----------( 147      ( 2022 17:53 )             34.1        A/P: S/P C/S POD  , recovering well   - encourage ambulation  - encourage PO hydration  - encourage incentive spirometry use  - monitor vitals, bleeding   - simethicone & senna   - DVT ppx: lovenox & SCDs  - diet:   - indwelling urinary catheter?  - IVF?  - pain mgmt prn   - f/u AM CBC   - anticipate d/c home

## 2022-07-22 NOTE — OB PROVIDER DELIVERY SUMMARY - NSSELHIDDEN_OBGYN_ALL_OB_FT
[NS_DeliveryRN_OBGYN_ALL_OB_FT:[id number]=],[NS_CirculateRN2_OBGYN_ALL_OB_FT:[id number]=],[NS_DeliveryAttending1_OBGYN_ALL_OB_FT:[id number]=],[NS_DeliveryAssist1_OBGYN_ALL_OB_FT:[id number]=]

## 2022-07-22 NOTE — OB PROVIDER DELIVERY SUMMARY - NSPROVIDERDELIVERYNOTE_OBGYN_ALL_OB_FT
delivery of viable male infant in the cephalic position with APGARs 9/9. Minimal adhesions noted between uterus and bladder. Normal appearing bilateral fallopian tubes and ovaries. Please see operative dictation for the full delivery summary

## 2022-07-22 NOTE — BRIEF OPERATIVE NOTE - NSICDXBRIEFPOSTOP_GEN_ALL_CORE_FT
POST-OP DIAGNOSIS:  Pregnancy with 38 completed weeks gestation 22-Jul-2022 01:37:46  Blanca Verdugo  Arrest of dilation, delivered, current hospitalization 22-Jul-2022 01:37:55  Blanca Verdugo

## 2022-07-23 VITALS
RESPIRATION RATE: 18 BRPM | TEMPERATURE: 98 F | DIASTOLIC BLOOD PRESSURE: 54 MMHG | HEART RATE: 66 BPM | SYSTOLIC BLOOD PRESSURE: 109 MMHG

## 2022-07-23 LAB
HBV SURFACE AG SER-ACNC: SIGNIFICANT CHANGE UP
RUBV IGG SER-ACNC: 2.1 INDEX — SIGNIFICANT CHANGE UP
RUBV IGG SER-IMP: POSITIVE — SIGNIFICANT CHANGE UP

## 2022-07-23 RX ORDER — SIMETHICONE 80 MG/1
1 TABLET, CHEWABLE ORAL
Qty: 18 | Refills: 0
Start: 2022-07-23 | End: 2022-07-25

## 2022-07-23 RX ORDER — IBUPROFEN 200 MG
1 TABLET ORAL
Qty: 0 | Refills: 0 | DISCHARGE
Start: 2022-07-23

## 2022-07-23 RX ORDER — SENNA PLUS 8.6 MG/1
2 TABLET ORAL
Qty: 14 | Refills: 0
Start: 2022-07-23 | End: 2022-07-29

## 2022-07-23 RX ORDER — IBUPROFEN 200 MG
600 TABLET ORAL EVERY 6 HOURS
Refills: 0 | Status: DISCONTINUED | OUTPATIENT
Start: 2022-07-23 | End: 2022-07-23

## 2022-07-23 RX ADMIN — SIMETHICONE 80 MILLIGRAM(S): 80 TABLET, CHEWABLE ORAL at 05:17

## 2022-07-23 RX ADMIN — OXYCODONE AND ACETAMINOPHEN 2 TABLET(S): 5; 325 TABLET ORAL at 08:37

## 2022-07-23 RX ADMIN — SIMETHICONE 80 MILLIGRAM(S): 80 TABLET, CHEWABLE ORAL at 01:32

## 2022-07-23 RX ADMIN — Medication 600 MILLIGRAM(S): at 11:11

## 2022-07-23 RX ADMIN — OXYCODONE AND ACETAMINOPHEN 2 TABLET(S): 5; 325 TABLET ORAL at 09:00

## 2022-07-23 RX ADMIN — Medication 30 MILLIGRAM(S): at 05:51

## 2022-07-23 RX ADMIN — SIMETHICONE 80 MILLIGRAM(S): 80 TABLET, CHEWABLE ORAL at 11:18

## 2022-07-23 RX ADMIN — Medication 1 TABLET(S): at 11:11

## 2022-07-23 RX ADMIN — Medication 30 MILLIGRAM(S): at 05:16

## 2022-07-23 RX ADMIN — Medication 600 MILLIGRAM(S): at 11:45

## 2022-07-23 NOTE — DISCHARGE NOTE OB - MEDICATION SUMMARY - MEDICATIONS TO STOP TAKING
[Time Spent: ___ minutes] : I have spent [unfilled] minutes of time on the encounter.
I will STOP taking the medications listed below when I get home from the hospital:  None

## 2022-07-23 NOTE — DISCHARGE NOTE OB - PATIENT PORTAL LINK FT
You can access the FollowMyHealth Patient Portal offered by [facility] by registering at the following website: [email or web address]. By joining [employer]’s FollowMyHealth portal, you will also be able to view your health information using other applications (apps) compatible with our system.

## 2022-07-23 NOTE — DISCHARGE NOTE OB - NS MD DC FALL RISK RISK
For information on Fall & Injury Prevention, visit: [email or web address] OR  [email or web address] OR  https://www.cdc.gov/steadi/patient.html

## 2022-07-23 NOTE — DISCHARGE NOTE OB - CARE PROVIDER_API CALL
Megan Villanueva)  Obstetrics and Gynecology  [address] [place] [address]  Phone: [phone]  Fax: [phone]  Follow Up Time:

## 2022-07-23 NOTE — PROGRESS NOTE ADULT - ASSESSMENT
A/P: 36yo P2 S/P repeat c/s for arrest of dilation (c/s#2) POD#2  , recovering well   - continue routine post op care  - encourage ambulation, PO hydration  - monitor vitals, bleeding   - simethicone/senna  - DVT prophylaxis: lovenox + scds  - pain mgmt prn   - anticipate d/c home today    Dr. Villanueva to be made aware

## 2022-07-23 NOTE — PROGRESS NOTE ADULT - SUBJECTIVE AND OBJECTIVE BOX
CARLA FARAH  37y  Female    PGY3 Note:    Pt is POD#2. Pt is recovering well, no acute complaints. Pt denies heavy vaginal bleeding, pain well controlled on PO medication. Patient is ambulating, tolerating a regular diet, voiding, passing flatus. Breast feeding.  Denies headache, chest pain, SOB, fever, chills, nausea, vomiting, extermity pain or swelling.       PAST MEDICAL & SURGICAL HISTORY:  No pertinent past medical history      H/O:           Physical Exam  Vital Signs Last 24 Hrs  T(C): 36.1 (2022 23:06), Max: 36.1 (2022 23:06)  T(F): 97 (2022 23:06), Max: 97 (2022 23:06)  HR: 77 (2022 23:06) (63 - 77)  BP: 100/58 (2022 23:06) (97/50 - 113/56)  RR: 17 (2022 23:06) (17 - 18)      Gen: AAOx3, NAD  Heart: RRR, no M/R/G  Lungs: CTAB  Fundus: firm, below umbilicus, nontender   Wound: Pfannenstiel incision, steris in place c/d/i   Abd: Soft, appropriately tender near incision site, mildly distended, BS+  Lochia: minimal  Ext: No calf tenderness, no swelling    Labs:                        10.2   15.50 )-----------( 137      ( 2022 13:12 )             29.5                         11.9   12.54 )-----------( 147      ( 2022 17:53 )             34.1        MEDICATIONS  (STANDING):  diphtheria/tetanus/pertussis (acellular) Vaccine (ADAcel) 0.5 milliLiter(s) IntraMuscular once  enoxaparin Injectable 40 milliGRAM(s) SubCutaneous every 24 hours  ibuprofen  Tablet. 600 milliGRAM(s) Oral every 6 hours  lactated ringers. 1000 milliLiter(s) (125 mL/Hr) IV Continuous <Continuous>  oxytocin Infusion 333.333 milliUNIT(s)/Min (1000 mL/Hr) IV Continuous <Continuous>  prenatal multivitamin 1 Tablet(s) Oral daily    MEDICATIONS  (PRN):  diphenhydrAMINE 25 milliGRAM(s) Oral every 6 hours PRN Pruritus  HYDROmorphone  Injectable 1 milliGRAM(s) IV Push every 4 hours PRN Severe Pain (7 - 10)  lanolin Ointment 1 Application(s) Topical every 6 hours PRN Sore Nipples  magnesium hydroxide Suspension 30 milliLiter(s) Oral two times a day PRN Constipation  oxycodone    5 mG/acetaminophen 325 mG 2 Tablet(s) Oral every 6 hours PRN Severe Pain (7 - 10)  simethicone 80 milliGRAM(s) Chew every 4 hours PRN Gas      CARLA FARAH  37y  Female    PGY3 Note:    Pt is POD#2. Pt is recovering well, no acute complaints. Pt denies heavy vaginal bleeding, pain well controlled on PO medication. Patient is ambulating, tolerating a regular diet, voiding, passing flatus. Breast feeding.  Denies headache, chest pain, SOB, fever, chills, nausea, vomiting, extermity pain or swelling.       PAST MEDICAL & SURGICAL HISTORY:  No pertinent past medical history      H/O:           Physical Exam  Vital Signs Last 24 Hrs  T(C): 36.1 (2022 23:06), Max: 36.1 (2022 23:06)  T(F): 97 (2022 23:06), Max: 97 (2022 23:06)  HR: 77 (2022 23:06) (63 - 77)  BP: 100/58 (2022 23:06) (97/50 - 113/56)  RR: 17 (2022 23:06) (17 - 18)      Gen: AAOx3, NAD  Heart: RRR, no M/R/G  Lungs: CTAB  Fundus: firm, below umbilicus, nontender   Wound: Pfannenstiel incision, majority of steris removed with KAMERON removal per pt request  Abd: Soft, appropriately tender near incision site, mildly distended, BS+  Lochia: minimal  Ext: No calf tenderness, no swelling    Labs:                        10.2   15.50 )-----------( 137      ( 2022 13:12 )             29.5                         11.9   12.54 )-----------( 147      ( 2022 17:53 )             34.1        MEDICATIONS  (STANDING):  diphtheria/tetanus/pertussis (acellular) Vaccine (ADAcel) 0.5 milliLiter(s) IntraMuscular once  enoxaparin Injectable 40 milliGRAM(s) SubCutaneous every 24 hours  ibuprofen  Tablet. 600 milliGRAM(s) Oral every 6 hours  lactated ringers. 1000 milliLiter(s) (125 mL/Hr) IV Continuous <Continuous>  oxytocin Infusion 333.333 milliUNIT(s)/Min (1000 mL/Hr) IV Continuous <Continuous>  prenatal multivitamin 1 Tablet(s) Oral daily    MEDICATIONS  (PRN):  diphenhydrAMINE 25 milliGRAM(s) Oral every 6 hours PRN Pruritus  HYDROmorphone  Injectable 1 milliGRAM(s) IV Push every 4 hours PRN Severe Pain (7 - 10)  lanolin Ointment 1 Application(s) Topical every 6 hours PRN Sore Nipples  magnesium hydroxide Suspension 30 milliLiter(s) Oral two times a day PRN Constipation  oxycodone    5 mG/acetaminophen 325 mG 2 Tablet(s) Oral every 6 hours PRN Severe Pain (7 - 10)  simethicone 80 milliGRAM(s) Chew every 4 hours PRN Gas

## 2022-07-23 NOTE — DISCHARGE NOTE OB - CARE PLAN
Principal Discharge DX:	 delivery delivered  Assessment and plan of treatment:	If you expirence any of the following, please notify your provider:  -fever >100.4F  -increased vaginal bleeding or clotting (saturating a pad an hour)  -foul smelling discharge or bloody discharge from your incision site  -severe abdominal, vaginal, or rectal pain   -persistent headache or vision changes  -swollen areas on your legs that are red, hot, or painful   -swollen, hot, painful areas and/or streaks on your breasts  -cracked or bleeding nipples  -mood swings, depression, or crying spells lasting more than 3 days     Nothing in the vagina for 6 weeks. No intercourse for 6 weeks. No bath tubs, swimming pools, or hot tubs for 6 weeks.   1

## 2022-07-23 NOTE — DISCHARGE NOTE OB - MEDICATION SUMMARY - MEDICATIONS TO TAKE
I will START or STAY ON the medications listed below when I get home from the hospital:    ibuprofen 600 mg oral tablet  -- 1 tab(s) by mouth every 6 hours  -- Indication: For for pain   I will START or STAY ON the medications listed below when I get home from the hospital:    ibuprofen 600 mg oral tablet  -- 1 tab(s) by mouth every 6 hours  -- Indication: For for pain    oxycodone-acetaminophen 5 mg-325 mg oral tablet  -- 1 tab(s) by mouth every 6 hours MDD:4  -- Caution federal law prohibits the transfer of this drug to any person other  than the person for whom it was prescribed.  May cause drowsiness.  Alcohol may intensify this effect.  Use care when operating dangerous machinery.  This prescription cannot be refilled.  This product contains acetaminophen.  Do not use  with any other product containing acetaminophen to prevent possible liver damage.  Using more of this medication than prescribed may cause serious breathing problems.    -- Indication: For pain

## 2022-07-27 DIAGNOSIS — Z3A.37 37 WEEKS GESTATION OF PREGNANCY: ICD-10-CM

## 2022-07-27 DIAGNOSIS — O34.211 MATERNAL CARE FOR LOW TRANSVERSE SCAR FROM PREVIOUS CESAREAN DELIVERY: ICD-10-CM

## 2023-01-30 NOTE — ED ADULT NURSE NOTE - HOW OFTEN DO YOU HAVE A DRINK CONTAINING ALCOHOL?
Department of Anesthesiology  Preprocedure Note       Name:  Chip Acharya   Age:  48 y.o.  :  1969                                          MRN:  [id number]         Date:  2023      Surgeon: Coby Vasquez):  Sonam Mckinney MD    Procedure: Procedure(s):  COLORECTAL CANCER SCREENING, NOT HIGH RISK    Medications prior to admission:   Prior to Admission medications    Medication Sig Start Date End Date Taking? Authorizing Provider   atorvastatin (LIPITOR) 20 MG tablet Take 1 tablet by mouth daily  Patient not taking: No sig reported 10/31/22   FLOYD Collins NP   meloxicam (MOBIC) 7.5 MG tablet Take 1 tablet by mouth daily as needed for Pain (take with food-no other NSAIDs)  Patient not taking: No sig reported 22   FLOYD Collins NP       Current medications:    No current facility-administered medications for this encounter. Allergies:  No Known Allergies    Problem List:    Patient Active Problem List   Diagnosis Code    Depression F32. A    Anxiety F41.9    Insomnia G47.00    Chronic pain of right knee M25.561, G89.29    Mixed hyperlipidemia E78.2    Viral gastroenteritis A08.4    Malaise C44.18    Periumbilical abdominal cramping R10.33    Diarrhea R19.7    Bilateral hand pain M79.641, M79.642    Bilateral wrist pain M25.531, M25.532    Numbness and tingling in both hands R20.0, R20.2    Hypokalemia E87.6    Encounter for screening colonoscopy Z12.11       Past Medical History:  History reviewed. No pertinent past medical history.     Past Surgical History:        Procedure Laterality Date    OTHER SURGICAL HISTORY      vaginal wall mesh    OTHER SURGICAL HISTORY      gum surgery    OTHER SURGICAL HISTORY      Partial hysterectomy cervix removed per pt    TUBAL LIGATION         Social History:    Social History     Tobacco Use    Smoking status: Never    Smokeless tobacco: Never   Substance Use Topics    Alcohol use: Not Currently Counseling given: Not Answered      Vital Signs (Current):   Vitals:    01/26/23 1515   Weight: 168 lb (76.2 kg)   Height: 5' 3\" (1.6 m)                                              BP Readings from Last 3 Encounters:   01/18/23 122/74   12/16/22 136/74   10/31/22 126/82       NPO Status: Time of last liquid consumption: 2000                        Time of last solid consumption: 1000                        Date of last liquid consumption: 01/29/23                        Date of last solid food consumption: 01/29/23    BMI:   Wt Readings from Last 3 Encounters:   01/26/23 168 lb (76.2 kg)   01/18/23 169 lb (76.7 kg)   12/16/22 166 lb 12.8 oz (75.7 kg)     Body mass index is 29.76 kg/m². CBC:   Lab Results   Component Value Date/Time    WBC 8.1 10/31/2022 04:18 PM    RBC 4.58 10/31/2022 04:18 PM    HGB 13.2 10/31/2022 04:18 PM    HCT 41.3 10/31/2022 04:18 PM    MCV 90.2 10/31/2022 04:18 PM    RDW 13.3 10/31/2022 04:18 PM     10/31/2022 04:18 PM       CMP:   Lab Results   Component Value Date/Time     12/16/2022 11:45 AM    K 4.3 12/16/2022 11:45 AM     12/16/2022 11:45 AM    CO2 25 12/16/2022 11:45 AM    BUN 12 12/16/2022 11:45 AM    CREATININE 0.70 12/16/2022 11:45 AM    GFRAA >60 09/23/2022 04:42 PM    LABGLOM >60 12/16/2022 11:45 AM    GLUCOSE 86 12/16/2022 11:45 AM    PROT 7.4 10/31/2022 04:18 PM    CALCIUM 9.3 12/16/2022 11:45 AM    BILITOT 0.4 10/31/2022 04:18 PM    ALKPHOS 121 10/31/2022 04:18 PM    AST 22 10/31/2022 04:18 PM    ALT 37 10/31/2022 04:18 PM       POC Tests: No results for input(s): POCGLU, POCNA, POCK, POCCL, POCBUN, POCHEMO, POCHCT in the last 72 hours.     Coags: No results found for: PROTIME, INR, APTT    HCG (If Applicable): No results found for: PREGTESTUR, PREGSERUM, HCG, HCGQUANT     ABGs: No results found for: PHART, PO2ART, QMZ4DJU, ZEX5JOO, BEART, X8EMPWJV     Type & Screen (If Applicable):  No results found for: LABABO, LABRH    Drug/Infectious Status (If Applicable):  Lab Results   Component Value Date/Time    HEPCAB NONREACTIVE 09/23/2022 04:42 PM       COVID-19 Screening (If Applicable): No results found for: COVID19        Anesthesia Evaluation  Patient summary reviewed and Nursing notes reviewed  Airway: Mallampati: II  TM distance: >3 FB   Neck ROM: full  Mouth opening: > = 3 FB   Dental:    (+) caps      Pulmonary:Negative Pulmonary ROS breath sounds clear to auscultation                             Cardiovascular:  Exercise tolerance: good (>4 METS),   (+) hyperlipidemia        Rhythm: regular  Rate: normal                    Neuro/Psych:   (+) depression/anxiety             GI/Hepatic/Renal: Neg GI/Hepatic/Renal ROS            Endo/Other: Negative Endo/Other ROS                    Abdominal:             Vascular: negative vascular ROS. Other Findings:           Anesthesia Plan      TIVA     ASA 2       Induction: intravenous. Anesthetic plan and risks discussed with patient and child/children (Daughter interpreting).                         Sean Cross MD   1/30/2023 Never

## 2024-05-06 NOTE — OB RN PREOPERATIVE CHECKLIST - NS_PREOPMEDADMIN_OBGYN_ALL_OB

## 2025-01-14 NOTE — OB PROVIDER DELIVERY SUMMARY - NS_PLACENTA_OBGYN_ALL_OB_DT
21-Jul-2022 23:46 [FreeTextEntry1] : Fairly low risk patient being cleared for low risk procedure. No acute medical contraindications, thus it is an acceptable risk for patient have surgery performed pending acceptable blood work  For complex pain-to follow-up with pain management.  Will need of multimodal approach.  For now we will add gabapentin to attempt to treat neuropathic pain.  Will initially start taking 300 mg at night and then can increase to 3 times a day.

## 2025-06-23 NOTE — PRE-ANESTHESIA EVALUATION ADULT - NSPROPOSEDPROCEDFT_GEN_ALL_CORE
Outpatient Rehab    Physical Therapy Visit    Patient Name: Manuela Reyes  MRN: [id number]  YOB: 1948  Encounter Date: 6/23/2025    Therapy Diagnosis:   Encounter Diagnoses   Name Primary?    Acute bilateral low back pain without sciatica Yes    Strain of lumbar region, subsequent encounter     Decreased ROM of lumbar spine      Physician: Yvette Turner, *    Physician Orders: Eval and Treat  Medical Diagnosis: Strain of lumbar region, subsequent encounter  Decreased ROM of lumbar spine    Visit # / Visits Authorized:  16 / 20  Insurance Authorization Period: 4/17/2025 to 12/31/2025  Date of Evaluation: 4/17/2025  Plan of Care Certification: 4/17/2025 to 6/26/2025   Progress note due 30 days from 6/9/25       Time In:   10:36 am  Time Out:  11: 30 am (PT one on one only)  Total Time (in minutes):   54  Total Billable Time (in minutes):  54    FOTO:  Intake Score:  %  Survey Score 2:  %  Survey Score 3:  %    Precautions:       Subjective   Pt reports her HEP is progressing well. PT and pt discussed we are ramping up her work outs to increase load but we will be progressing to D/C in the next few sessions as she is advancing well but needs more functional training and more work on loading to reduce fatigue at the end of the day. PT explained she will have to progress to advancing her HEP over time as well as it will take time to recover good strength as she has been weak for some time..  Pain reported as 3/10. low back centrally and between scapulae    Objective            Treatment:  Therapeutic Exercise  TE 2: sciatic neural glides 2x10 R/L  TE 3: lower trunk rotation for ROM 4# hold in front of chest 2x 10 for extensor endurance  TE 4: ball up wall with red theraball for Extensor endurance 2x10  TE 5: shuttle 4 bands 3 min  Balance/Neuromuscular Re-Education  NMR 2: cat cow x 30 - slow; standing overhead press up 4# ball 3x 10 with cues on core engagement; deep neck flexor 3x max hold  time (28 was her max hold time)  NMR 3: seated TSp extension with front rack ball hold (feet on a little foot stool) 4 x 8 4# ball  NMR 7: runner with R leg on wall 10 x 10 sec hold  NMR 8: 's carry 2# L then R 50 ft x 2 each then 3# 1x 50 ft for scapular control with cues to avoid hiking  Therapeutic Activity  TA 1: sit to stand goblet hold 4# 2x 10 (20 inch box)  TA 3: goblet carry 5 # 1x 80 ft  10 # 2x 80 ft  TA 4: dead lift 4 # ball (20 inch box to cue external hip hinge direction) 1x 10  TA 5: waist to overhead lift (arms outstretched) 4# ball 2x 8 cues on core engagement to reduce sway back    Time Entry(in minutes):  Neuromuscular Re-Education Time Entry: 24  Therapeutic Activity Time Entry: 16  Therapeutic Exercise Time Entry: 14    Assessment & Plan   Assessment: PT increased load to challenge mid T spine and lumbar/hip control. Pt fatigues on the runner work out with the L hip engaging in abduction to push the R hip against the wall. With gait, she has R hip drop due to poor L hip engagement and posterior sling weakness so PT added this to help work on control to reduce R hip drop when in L single leg stand postures. Pt tolerated loading well with rest breaks intermittently.  Evaluation/Treatment Tolerance: Patient limited by fatigue    The patient will continue to benefit from skilled outpatient physical therapy in order to address the deficits listed in the problem list on the initial evaluation, provide patient and family education, and maximize the patients level of independence in the home and community environments.     The patient's spiritual, cultural, and educational needs were considered, and the patient is agreeable to the plan of care and goals.           Plan: PT to progress core mobility / trunk strengthening and continue with Manual therapy as needed postures.    Goals:   Active       long term goals       lumbar flexion pain free; sit to stand without guarding (Progressing)        Start:  04/17/25    Expected End:  06/26/25            5/5 hip abduction strength (Progressing)       Start:  04/17/25    Expected End:  06/26/25            dead lifting 50 # for home tasks (Progressing)       Start:  04/17/25    Expected End:  06/26/25              Resolved       short term goals       I with HEP (Met)       Start:  04/17/25    Expected End:  05/15/25    Resolved:  05/12/25         cervical flexion ROM 60 degrees and rotation 80 degrees or better (Met)       Start:  04/17/25    Expected End:  05/15/25    Resolved:  06/18/25             Brittney House, PT       Repeat S-section